# Patient Record
Sex: FEMALE | Race: WHITE
[De-identification: names, ages, dates, MRNs, and addresses within clinical notes are randomized per-mention and may not be internally consistent; named-entity substitution may affect disease eponyms.]

---

## 2018-11-14 ENCOUNTER — HOSPITAL ENCOUNTER (EMERGENCY)
Dept: HOSPITAL 75 - ER | Age: 53
Discharge: ANOTHER HOSPITAL | End: 2018-11-14
Payer: SELF-PAY

## 2018-11-14 VITALS — WEIGHT: 240 LBS | BODY MASS INDEX: 37.67 KG/M2 | HEIGHT: 67 IN

## 2018-11-14 VITALS — SYSTOLIC BLOOD PRESSURE: 159 MMHG | DIASTOLIC BLOOD PRESSURE: 89 MMHG

## 2018-11-14 DIAGNOSIS — E78.00: ICD-10-CM

## 2018-11-14 DIAGNOSIS — I63.50: Primary | ICD-10-CM

## 2018-11-14 DIAGNOSIS — Z98.51: ICD-10-CM

## 2018-11-14 DIAGNOSIS — Z90.89: ICD-10-CM

## 2018-11-14 DIAGNOSIS — Z91.14: ICD-10-CM

## 2018-11-14 DIAGNOSIS — I10: ICD-10-CM

## 2018-11-14 DIAGNOSIS — Z90.710: ICD-10-CM

## 2018-11-14 DIAGNOSIS — R56.9: ICD-10-CM

## 2018-11-14 LAB
ALBUMIN SERPL-MCNC: 4 GM/DL (ref 3.2–4.5)
ALP SERPL-CCNC: 88 U/L (ref 40–136)
ALT SERPL-CCNC: 47 U/L (ref 0–55)
AMORPH SED URNS QL MICRO: (no result) /LPF
APTT BLD: 28 SEC (ref 24–35)
APTT PPP: YELLOW S
BACTERIA #/AREA URNS HPF: (no result) /HPF
BARBITURATES UR QL: NEGATIVE
BASOPHILS # BLD AUTO: 0 10^3/UL (ref 0–0.1)
BASOPHILS NFR BLD AUTO: 0 % (ref 0–10)
BENZODIAZ UR QL SCN: NEGATIVE
BILIRUB SERPL-MCNC: 0.4 MG/DL (ref 0.1–1)
BILIRUB UR QL STRIP: NEGATIVE
BUN/CREAT SERPL: 18
CALCIUM SERPL-MCNC: 9.6 MG/DL (ref 8.5–10.1)
CHLORIDE SERPL-SCNC: 97 MMOL/L (ref 98–107)
CO2 SERPL-SCNC: 27 MMOL/L (ref 21–32)
COCAINE UR QL: NEGATIVE
CREAT SERPL-MCNC: 0.84 MG/DL (ref 0.6–1.3)
D DIMER PPP FEU-MCNC: 0.33 UG/ML (ref 0–0.49)
EOSINOPHIL # BLD AUTO: 0.2 10^3/UL (ref 0–0.3)
EOSINOPHIL NFR BLD AUTO: 2 % (ref 0–10)
ERYTHROCYTE [DISTWIDTH] IN BLOOD BY AUTOMATED COUNT: 13.6 % (ref 10–14.5)
FIBRINOGEN PPP-MCNC: CLEAR MG/DL
GFR SERPLBLD BASED ON 1.73 SQ M-ARVRAT: > 60 ML/MIN
GLUCOSE SERPL-MCNC: 358 MG/DL (ref 70–105)
GLUCOSE UR STRIP-MCNC: (no result) MG/DL
HCT VFR BLD CALC: 42 % (ref 35–52)
HGB BLD-MCNC: 14.4 G/DL (ref 11.5–16)
INR PPP: 1 (ref 0.8–1.4)
KETONES UR QL STRIP: NEGATIVE
LEUKOCYTE ESTERASE UR QL STRIP: NEGATIVE
LYMPHOCYTES # BLD AUTO: 2.9 X 10^3 (ref 1–4)
LYMPHOCYTES NFR BLD AUTO: 31 % (ref 12–44)
MANUAL DIFFERENTIAL PERFORMED BLD QL: NO
MCH RBC QN AUTO: 28 PG (ref 25–34)
MCHC RBC AUTO-ENTMCNC: 35 G/DL (ref 32–36)
MCV RBC AUTO: 81 FL (ref 80–99)
METHADONE UR QL SCN: NEGATIVE
METHAMPHETAMINE SCREEN URINE S: POSITIVE
MONOCYTES # BLD AUTO: 0.6 X 10^3 (ref 0–1)
MONOCYTES NFR BLD AUTO: 6 % (ref 0–12)
NEUTROPHILS # BLD AUTO: 5.7 X 10^3 (ref 1.8–7.8)
NEUTROPHILS NFR BLD AUTO: 61 % (ref 42–75)
NITRITE UR QL STRIP: NEGATIVE
OPIATES UR QL SCN: NEGATIVE
OXYCODONE UR QL: NEGATIVE
PH UR STRIP: 5 [PH] (ref 5–9)
PLATELET # BLD: 333 10^3/UL (ref 130–400)
PMV BLD AUTO: 9.3 FL (ref 7.4–10.4)
POTASSIUM SERPL-SCNC: 3.8 MMOL/L (ref 3.6–5)
PROPOXYPH UR QL: NEGATIVE
PROT SERPL-MCNC: 7.7 GM/DL (ref 6.4–8.2)
PROT UR QL STRIP: (no result)
PROTHROMBIN TIME: 12.8 SEC (ref 12.2–14.7)
RBC # BLD AUTO: 5.17 10^6/UL (ref 4.35–5.85)
RBC #/AREA URNS HPF: (no result) /HPF
SODIUM SERPL-SCNC: 133 MMOL/L (ref 135–145)
SP GR UR STRIP: 1.02 (ref 1.02–1.02)
TRICYCLICS UR QL SCN: NEGATIVE
UROBILINOGEN UR-MCNC: NORMAL MG/DL
WBC # BLD AUTO: 9.3 10^3/UL (ref 4.3–11)
WBC #/AREA URNS HPF: (no result) /HPF

## 2018-11-14 PROCEDURE — 85379 FIBRIN DEGRADATION QUANT: CPT

## 2018-11-14 PROCEDURE — 36415 COLL VENOUS BLD VENIPUNCTURE: CPT

## 2018-11-14 PROCEDURE — 81000 URINALYSIS NONAUTO W/SCOPE: CPT

## 2018-11-14 PROCEDURE — 80053 COMPREHEN METABOLIC PANEL: CPT

## 2018-11-14 PROCEDURE — 71045 X-RAY EXAM CHEST 1 VIEW: CPT

## 2018-11-14 PROCEDURE — 93005 ELECTROCARDIOGRAM TRACING: CPT

## 2018-11-14 PROCEDURE — 84484 ASSAY OF TROPONIN QUANT: CPT

## 2018-11-14 PROCEDURE — 70498 CT ANGIOGRAPHY NECK: CPT

## 2018-11-14 PROCEDURE — 99292 CRITICAL CARE ADDL 30 MIN: CPT

## 2018-11-14 PROCEDURE — 85025 COMPLETE CBC W/AUTO DIFF WBC: CPT

## 2018-11-14 PROCEDURE — 70496 CT ANGIOGRAPHY HEAD: CPT

## 2018-11-14 PROCEDURE — 92977: CPT

## 2018-11-14 PROCEDURE — 85730 THROMBOPLASTIN TIME PARTIAL: CPT

## 2018-11-14 PROCEDURE — 82962 GLUCOSE BLOOD TEST: CPT

## 2018-11-14 PROCEDURE — 80306 DRUG TEST PRSMV INSTRMNT: CPT

## 2018-11-14 PROCEDURE — 51702 INSERT TEMP BLADDER CATH: CPT

## 2018-11-14 PROCEDURE — 93041 RHYTHM ECG TRACING: CPT

## 2018-11-14 PROCEDURE — 94640 AIRWAY INHALATION TREATMENT: CPT

## 2018-11-14 PROCEDURE — 99291 CRITICAL CARE FIRST HOUR: CPT

## 2018-11-14 PROCEDURE — 70450 CT HEAD/BRAIN W/O DYE: CPT

## 2018-11-14 PROCEDURE — 85610 PROTHROMBIN TIME: CPT

## 2018-11-14 NOTE — XMS REPORT
[facility]

 Created on: 2015



Beryl Limon

External Reference #: [id number]

: 1965

Sex: Female



Demographics







 Address  [address] [place]  [address]

 

 Home Phone  [phone]

 

 Preferred Language  Unknown

 

 Marital Status  Unknown

 

 Jainism Affiliation  Unknown

 

 Race  White

 

 Ethnic Group  Not  or 





Author







 Author  BART GARCIA

 

 Organization  [employer]

 

 Address  Unknown

 

 Phone  Unavailable







Care Team Providers







 Care Team Member Name  Role  Phone

 

 BART GARCIA  CP  Unavailable



                                                                



Allergies

          No Known Allergies                                                   
                                     



Problems

          





 Problem Type  Condition  ICD-9 Code  Onset Dates  Condition Status

 

 Problem  Hypertension  401.9     Active

 

 Problem  Low back pain  724.2     Active

 

 Problem  Routine adult health maintenance  V70.0     Active

 

 Assessment  Hypertension  401.9     Active

 

 Assessment  Low back pain  724.2     Active

 

 Problem  Multiple actinic keratoses  702.0     Active

 

 Assessment  Routine adult health maintenance  V70.0     Active



                                                                               
                                                                     



Medications

          No Known Medications                                                 
                                       



Procedures

          





 Procedure  Coding System  Code  Date

 

 COMPLETE CBC W/AUTO DIFF WBC  CPT-4  84271  Aug 25, 2015

 

 COMPREHEN METABOLIC PANEL  CPT-4  93596  Aug 25, 2015

 

 ASSAY THYROID STIM HORMONE  CPT-4  24693  Aug 25, 2015

 

 VENIPUNCT, ROUTINE*  CPT-4  64150  Aug 25, 2015

 

 LIPID PANEL  CPT-4  65268  Aug 25, 2015



                                                                               
                             



Results

          





 Name  Result  Date  Reference Range  Unit  Abnormality Flag

 

 ROUTINE VENIPUNCTURE               



                                                                    



Summary Purpose

          [employer] Submission
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
## 2018-11-14 NOTE — DIAGNOSTIC IMAGING REPORT
PROCEDURE:

1. CT head with intravenous contrast.

2. CT angiography of the head and neck with intravenous contrast.



TECHNIQUE: 

1. Pre and post contrast axial CT images at the level of the head

were obtained.

2. Postcontrast axial CT angiography images at the level of the

head and neck were obtained. Three-dimensional reformats were

obtained and provided.



DATE: November 14, 2018.



INDICATION: 53-year-old female, slurred speech. Evaluation for

stroke.



COMPARISON: CT head without contrast November 14, 2018 at 1557

hours.



FINDINGS: There is limitation of the evaluation relating to

motion artifact as well as positioning of the patient.



There is no acute intracranial hemorrhage based on initial very

recent comparison CT head without contrast exam. There is no

identified abnormal intracranial enhancement. There is no mass

effect or midline shift.



There is prominent quantum mottle artifact at the level of the

neck relating to patient body habitus and difficulties with

exposure.



The left common carotid artery is patent. The left internal

carotid artery is patent in its proximal segment and not well

contrast opacified as it enters the carotid canal. There is

essentially nondiagnostic assessment of the left internal carotid

artery beginning at the level of the carotid canal and extending

more superiorly. There is some blood flow noted within both

anterior cerebral arteries. There is some blood flow noted within

the left middle cerebral artery as well as the right middle

cerebral artery. There is otherwise essentially nondiagnostic

additional evaluation of both middle cerebral arteries. There is

blood flow within both posterior cerebral arteries as well as the

basilar artery. There is very minimal contrast opacification of

these vessels. There is significant limitation of evaluation of

the vertebral arteries relating to the degree of motion artifact

as well as poor opacification of the vessels. Both vertebral

arteries are conventional in origin.



There are upper lobe changes of emphysema. Visualized portions of

the lungs are grossly clear.



There is nonspecific near-complete opacification of the left

maxillary sinus.



IMPRESSION:

1. Markedly limited exam relating to motion artifact, quantum

mottle artifact, difficulty positioning the patient, and timing

of contrast bolus.

2. No obvious abnormal intracranial enhancement.

3. Markedly limited CT angiography head and neck exam without

obvious vascular occlusion.

4. Near-complete opacification of the left maxillary sinus which

is nonspecific.



Dictated by: 



  Dictated on workstation # [id number]

## 2018-11-14 NOTE — DIAGNOSTIC IMAGING REPORT
INDICATION: Possible stroke with slurred speech.



TECHNIQUE: Noncontrast brain CT is performed and compared with

06/08/2015.



FINDINGS: Portions of the study are limited due to motion

artifact. There are no extra-axial fluid collections. No

intracranial hemorrhage. No intracranial mass or mass effect. No

midline shift. The ventricles are normal in size and position.

Gray-white differentiation appears preserved. Some portions of

the temporal lobes are not well seen due to motion artifact.

There is no definite hyperdense vessel sign. Calvarial windows

show hyperostosis but no acute bony abnormality. Visualized

portions of the orbits are normal. There is fluid in the left

maxillary sinus.



IMPRESSION:



No acute intracranial abnormality with some limitation due to

motion artifact. There is no hemorrhage. There is hyperostosis of

the bony calvarium. There is opacification of the left maxillary

sinus.



Dictated by: 



  Dictated on workstation # [id number]

## 2018-11-14 NOTE — DIAGNOSTIC IMAGING REPORT
EXAMINATION: Chest radiograph, portable AP view.



DATE: November 14, 2018 at 1737 hours.



INDICATION: 53-year-old female, stroke protocol assessment.



COMPARISON: June 8, 2015.



FINDINGS: Stable overall appearance of the cardiomediastinal

silhouette, given differences in technique. There is no

identified pneumothorax. There is no large pleural effusion.

There are mildly prominent interstitial opacities which are more

prominent since the comparison exam.



IMPRESSION: Mildly prominent bilateral pulmonary interstitial

opacities. Differential diagnostic considerations would include

pulmonary interstitial edema or atypical infection.



Dictated by: 



  Dictated on workstation # [id number]

## 2018-11-14 NOTE — XMS REPORT
[facility]

 Created on: 2015



Beryl Limon

External Reference #: [id number]

: 1965

Sex: Female



Demographics







 Address  [address] [place]  [address]

 

 Home Phone  [phone]

 

 Preferred Language  Unknown

 

 Marital Status  Unknown

 

 Voodoo Affiliation  Unknown

 

 Race  White

 

 Ethnic Group  Not  or 





Author







 Author  BART GARCIA

 

 [facility]  eClinicalWorks

 

 Address  Unknown

 

 Phone  Unavailable







Care Team Providers







 Care Team Member Name  Role  Phone

 

 BART GARCIA  CP  Unavailable



                                                                



Allergies, Adverse Reactions, Alerts

          





 Substance  Reaction  Event Type

 

 N.K.D.A.  Info Not Available  Non Drug Allergy



                                                                               
         



Problems

          





 Problem Type  Condition  ICD-9 Code  Onset Dates  Condition Status

 

 Assessment  Multiple actinic keratoses  702.0     Active

 

 Problem  Hypertension  401.9     Active

 

 Problem  Low back pain  724.2     Active

 

 Problem  Routine adult health maintenance  V70.0     Active

 

 Assessment  Hypertension  401.9     Active

 

 Assessment  Low back pain  724.2     Active

 

 Problem  Multiple actinic keratoses  702.0     Active

 

 Assessment  Routine adult health maintenance  V70.0     Active



                                                                               
                                                                               



Medications

          





 Medication  Code System  Code  Instructions  Start Date  End Date  Status  
Dosage

 

 Lisinopril-Hydrochlorothiazide  NDC  [address]-80  10-12.5 MG Orally Once a 
day  Aug 24, 2015        1 tablet

 

 Cyclobenzaprine HCl  NDC  [address]-57  5 MG Orally Three times a day  Aug 24
, 2015        1 tablet



                                                                               
                   



Procedures

          





 Procedure  Coding System  Code  Date

 

 Office Visit, New Pt., Level 4  CPT-4  79999  Aug 24, 2015



                                                                               
                   



Vital Signs

          





 Date/Time:  Aug 24, 2015

 

 Temperature  98.4 F

 

 Weight  236.8 lbs

 

 Height  67 in

 

 BMI  37.08 Index

 

 Blood Pressure Diastolic  96 mmHg

 

 Blood Pressure Systolic  142 mmHg

 

 Cardiac Monitoring Heart Rate  80 bpm



                                                                              



Results

          No Known Results                                                     
               



Summary Purpose

          eClinicalWorks Submission

## 2018-11-14 NOTE — XMS REPORT
[facility]

 Created on: 2018



Beryl Limon

External Reference #: [id number]

: 1965

Sex: Female



Demographics







 Address  [address] [place]  [address]

 

 Preferred Language  Unknown

 

 Marital Status  Unknown

 

 Buddhism Affiliation  Unknown

 

 Race  Unknown

 

 Ethnic Group  Unknown





Author







 Author  FRED KELSEY  [facility]

 

 Address  Unknown

 

 Phone  [phone]







Care Team Providers







 Care Team Member Name  Role  Phone

 

 FRED KELSEY  Unavailable  [phone]







PROBLEMS







 Type  Condition  ICD9-CM Code  RNT53-BY Code  Onset Dates  Condition Status  
SNOMED Code

 

 Problem  Essential (primary) hypertension     I10     Active  72949659

 

 Problem  Other hyperlipidemia     E78.4     Active  61901619

 

 Problem  Lumbago with sciatica, unspecified side     M54.40     Active  
[id number]

 

 Problem  Multiple actinic keratoses     L57.0     Active  [id number]







ALLERGIES

No Known Allergies



ENCOUNTERS







 Encounter  Location  Date  Diagnosis

 

 [facility]  [id number] [place] [address] 
[id number]  22 Dec, 2017  Dental examination Z01.20 and Dental caries K02.9

 

 [facility]  [id number] [place] [phone]
[phone]  01 Sep, 2015  Low back pain 724.2 and Hyperlipidemia 272.4

 

 [facility]  [id number] [place] [phone]
[phone]  25 Aug, 2015  Routine adult health maintenance V70.0 ; Hypertension 401.9 
and Low back pain 724.2

 

 [facility]  [id number] [place] [phone]
[phone]  24 Aug, 2015  Routine adult health maintenance V70.0 ; Hypertension 401.9 
; Low back pain 724.2 and Multiple actinic keratoses 702.0







IMMUNIZATIONS

No Known Immunizations



SOCIAL HISTORY

Never Assessed



REASON FOR VISIT

idalia



PLAN OF CARE





VITAL SIGNS







 Height  67 in  2017

 

 Blood pressure systolic  175 mmHg  2017

 

 Blood pressure diastolic  100 mmHg  2017







MEDICATIONS







 Medication  Instructions  Dosage  Frequency  Start Date  End Date  Duration  
Status

 

 Lisinopril-Hydrochlorothiazide 10-12.5 MG  Orally Once a day  1 tablet  24h  
24 Aug, 2015        Not-Taking







RESULTS

No Results



PROCEDURES







 Procedure  Date Ordered  Result  Body Site

 

 LTD ORAL EVALUATION - PROBLEM FOCUS  Dec 22, 2017      

 

 INTRAORL-PERIAPICAL 1 FILM 96425  Dec 22, 2017      

 

 EXTRAC ERUPTED TOOTH/EXPOSED ROOT  Dec 22, 2017      

 

 INTRAORL-PERIAPICAL EA ADD FILM  Dec 22, 2017      

 

 EXTRAC ERUPTED TOOTH/EXPOSED ROOT  Dec 22, 2017      







INSTRUCTIONS





MEDICATIONS ADMINISTERED

No Known Medications



MEDICAL (GENERAL) HISTORY







 Type  Description  Date

 

 Medical History  IVDU--last used    

 

 Medical History  Hepatitis C   

 

 Medical History  hypertension   

 

 Medical History  hyperlipidemia   

 

 Surgical History  cholecystectomy  

 

 Surgical History  appendectomy  

 

 Surgical History  hysterectomy  

 

 Hospitalization History  bladder punctured with surgery
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
